# Patient Record
Sex: FEMALE | Race: ASIAN | NOT HISPANIC OR LATINO | Employment: FULL TIME | URBAN - METROPOLITAN AREA
[De-identification: names, ages, dates, MRNs, and addresses within clinical notes are randomized per-mention and may not be internally consistent; named-entity substitution may affect disease eponyms.]

---

## 2017-07-10 ENCOUNTER — OFFICE VISIT - HEALTHEAST (OUTPATIENT)
Dept: FAMILY MEDICINE | Facility: CLINIC | Age: 27
End: 2017-07-10

## 2017-07-10 DIAGNOSIS — Z30.9 CONTRACEPTION MANAGEMENT: ICD-10-CM

## 2017-07-10 ASSESSMENT — MIFFLIN-ST. JEOR: SCORE: 1148.14

## 2017-07-17 ENCOUNTER — OFFICE VISIT - HEALTHEAST (OUTPATIENT)
Dept: MIDWIFE SERVICES | Facility: CLINIC | Age: 27
End: 2017-07-17

## 2017-07-17 DIAGNOSIS — Z30.017 NEXPLANON INSERTION: ICD-10-CM

## 2017-07-17 ASSESSMENT — MIFFLIN-ST. JEOR: SCORE: 1145.87

## 2017-11-20 ENCOUNTER — AMBULATORY - HEALTHEAST (OUTPATIENT)
Dept: NURSING | Facility: CLINIC | Age: 27
End: 2017-11-20

## 2017-11-27 ENCOUNTER — OFFICE VISIT - HEALTHEAST (OUTPATIENT)
Dept: FAMILY MEDICINE | Facility: CLINIC | Age: 27
End: 2017-11-27

## 2017-11-27 DIAGNOSIS — Z23 IMMUNIZATION DUE: ICD-10-CM

## 2017-11-27 DIAGNOSIS — Z71.84 TRAVEL ADVICE ENCOUNTER: ICD-10-CM

## 2017-11-27 ASSESSMENT — MIFFLIN-ST. JEOR: SCORE: 1156.07

## 2018-03-28 ENCOUNTER — OFFICE VISIT - HEALTHEAST (OUTPATIENT)
Dept: FAMILY MEDICINE | Facility: CLINIC | Age: 28
End: 2018-03-28

## 2018-03-28 DIAGNOSIS — N64.4 BREAST PAIN, LEFT: ICD-10-CM

## 2018-03-28 ASSESSMENT — MIFFLIN-ST. JEOR: SCORE: 1190.09

## 2018-04-03 ENCOUNTER — HOSPITAL ENCOUNTER (OUTPATIENT)
Dept: MAMMOGRAPHY | Facility: CLINIC | Age: 28
Discharge: HOME | End: 2018-04-03
Attending: FAMILY MEDICINE

## 2018-04-03 DIAGNOSIS — N64.4 BREAST PAIN, LEFT: ICD-10-CM

## 2020-03-24 ENCOUNTER — OFFICE VISIT - HEALTHEAST (OUTPATIENT)
Dept: FAMILY MEDICINE | Facility: CLINIC | Age: 30
End: 2020-03-24

## 2020-03-24 DIAGNOSIS — B02.9 HERPES ZOSTER WITHOUT COMPLICATION: ICD-10-CM

## 2020-03-24 RX ORDER — IBUPROFEN 600 MG/1
600 TABLET, FILM COATED ORAL EVERY 6 HOURS PRN
Qty: 60 TABLET | Refills: 2 | Status: SHIPPED | OUTPATIENT
Start: 2020-03-24 | End: 2023-07-28

## 2020-07-15 ENCOUNTER — OFFICE VISIT - HEALTHEAST (OUTPATIENT)
Dept: FAMILY MEDICINE | Facility: CLINIC | Age: 30
End: 2020-07-15

## 2020-07-15 DIAGNOSIS — Z30.46 ENCOUNTER FOR REMOVAL AND REINSERTION OF NEXPLANON: ICD-10-CM

## 2020-07-15 ASSESSMENT — MIFFLIN-ST. JEOR: SCORE: 1198.03

## 2021-05-31 ENCOUNTER — RECORDS - HEALTHEAST (OUTPATIENT)
Dept: ADMINISTRATIVE | Facility: CLINIC | Age: 31
End: 2021-05-31

## 2021-05-31 VITALS — BODY MASS INDEX: 22.92 KG/M2 | WEIGHT: 116.75 LBS | HEIGHT: 60 IN

## 2021-05-31 VITALS — WEIGHT: 114.5 LBS | BODY MASS INDEX: 22.48 KG/M2 | HEIGHT: 60 IN

## 2021-05-31 VITALS — HEIGHT: 60 IN | WEIGHT: 115 LBS | BODY MASS INDEX: 22.58 KG/M2

## 2021-06-01 VITALS — HEIGHT: 60 IN | WEIGHT: 124.25 LBS | BODY MASS INDEX: 24.39 KG/M2

## 2021-06-04 VITALS
BODY MASS INDEX: 24.74 KG/M2 | TEMPERATURE: 97.1 F | OXYGEN SATURATION: 97 % | SYSTOLIC BLOOD PRESSURE: 100 MMHG | HEIGHT: 60 IN | DIASTOLIC BLOOD PRESSURE: 70 MMHG | RESPIRATION RATE: 20 BRPM | WEIGHT: 126 LBS | HEART RATE: 103 BPM

## 2021-06-07 NOTE — PROGRESS NOTES
Rohini Goodman is a 29 y.o. female who is being evaluated via a billable telephone visit.            Subjective:     Rohini Goodman is a 29 y.o. female who calls with complaint of    Chief Complaint   Patient presents with     Rash     on right groin X 4 days      Clear blisters.  Blisters getting brown.  Painful when walking/moving.  Had pain prior to rash appearling.  It's just on one leg.  Tried warm water    Still getting new spots.    No systemic sx (fever/malaise)    Review of Systems:  Remainder of complete review systems is negative.         Patient was evaluated by telephone rather than an in person visit due to currently outbreak of COVID-19.    The following portions of the patient's history were reviewed and updated as appropriate: allergies, current medications and problem list     Patient Active Problem List   Diagnosis     Beta-thalassemia Trait        No current outpatient medications on file.     Allergies  No known drug allergies      Objective:   Patient sent me a photo of rash.  The rash is on the right medial thigh and is vesicular with an erythematous base.  Sharply be demarcated region of rash.    Assessment/Plan:     1. Herpes zoster without complication  Since she is still developing new lesions, I am hopeful that valacyclovir will be helpful to her.  I have asked her to call again if worsening in the next few days.  Advised her it could take several weeks for symptoms to improve and that sometimes the discomfort lasts longer than the rash.  - valACYclovir (VALTREX) 1000 MG tablet; Take 1 tablet (1,000 mg total) by mouth 3 (three) times a day for 7 days. (For shingles/herpes zoster)  Dispense: 21 tablet; Refill: 0  - ibuprofen (ADVIL,MOTRIN) 600 MG tablet; Take 1 tablet (600 mg total) by mouth every 6 (six) hours as needed for pain. Take with food.  Dispense: 60 tablet; Refill: 2           Time visit started: 11:47 AM    Time visit ended: 11:57 AM     Phone call duration:  10 minutes  [id number]  (</= 10 min)  [id number] (11-20 min)  00947 (21-30 min)      Kasia Saul MD

## 2021-06-09 NOTE — PROGRESS NOTES
Procedure: Replacement of Nexplanon  Indication for procedure:  contraception, desires replacement of the same  Location: left  arm  Patient understands risks and benefits.  All of her questions were answered prior to starting the procedure.  She signed the consent form.  Anesthesia: 3 mL of 1% lidocaine without epinephrine was inserted through a 27-gauge needle at the incision site.  Procedure description: After anesthesia was achieved as above the area was prepped with iodine.  A small incision was made with 11 blade scalpel through the old scar from the previous implant insertion.  The implant was then grasped with a curved forceps and removed.  A new contraceptive implant was then inserted through this incision.  The incision was closed with a Steri-Strip and then the arm was wrapped with a pressure dressing.      Of note, pt is due for Pap Smear.  She declined to have this done today, but will make appointment to return to clinic for this.   Principal Discharge DX:	Facial contusion, initial encounter  Secondary Diagnosis:	Contusion of nose, initial encounter  Secondary Diagnosis:	Contusion of right shoulder, initial encounter

## 2021-06-11 NOTE — PROGRESS NOTES
SUBJECTIVE:    HPI:    27 y.o. female presenting today for Nexplanon insertion. She is a  with children ages 9 and 4. She is accompanied by her 3 yo son today. She had contraception counseling with Dr. Brown on 7/10/17. She has been on ortho Evra x 1 year and just ended that method prior to her LMP which started 7/10/17 and is ending today. She agreed to doing a UHCG today per our protocol.     She is Right hand dominant.  She has 2 child(pedrito) ages 9 and 4..  She is not breastfeeding.  Current contraception: Patch  Last sexual activity: She denies unprotected sexual activity in past 2 weeks.     PMH:  No known contraindications to Nexplanon      No current or past history of thrombosis or thromboembolic disorders       No hepatic tumors or active liver disease       No undiagnosed abnormal genital bleeding       No known or suspected carcinoma of the breast or personal history of breast cancer       No hypersensitivity to any of the components of NEXPLANON      No known history of keloids    ROS: 10 point review of symptoms otherwise negative except as detailed in HPI.     OBJECTIVE:  Physical Exam   Constitutional: She appears well-developed and well-nourished. No distress.   Cardiovascular: Normal rate and normal heart sounds.   No murmur heard.   Pulmonary/Chest: Effort normal and breath sounds normal.   Neurological: She is alert.   Skin: She is not diaphoretic.   Psychiatric: She has a normal mood and affect.     LABS: UPT today: Negative      ASSESSMENT:   We reviewed the Nexplanon literature and counseling re: full range of contraceptive options. She denies any contraindications to its use. We discussed that her bleeding profile will change. We discussed ACHES and encouraged her to call with any of those signs or symptoms. She is aware of 3 year effectiveness of this method.  She is aware of potential side effects including infection at site, intermenstrual bleeding, irregular bleeding, amenorrhea,  "need for minor surgical procedure to remove or more extensive if implant is deep    Appropriate candidate for Nexplanon    PLAN & PROCEDURE NOTE:  She elected to proceed with the placement after the risks and benefits were discussed. Denies allergy to local anesthesia, keloid formation.     Consent signed and will be scanned in EMR.     After cleansing left (non-dominant) arm above the elbow, 2 mL of 1% Lidocaine was administered along the planned injection site for Nexplanon. Insertion site cleansed with iodine. Nexplanon was then inserted 8cm above the medial epicondyle of the humerus, in the groove between the biceps and the triceps (sulcus bicipitalis medialis). Palpation revealed subdermal placement; the patient was able to feel implant as well.     Bleeding was minimal and a pressure dressing was applied with instructions to leave this in place for 24 hours. Pt was instructed to observe for signs/symptoms of any infection (localized tenderness, pain, inflammation) or excessive bruising.  No apparent distress at completion of procedure. No complications. Recommended condom back up x 7 - 10 days with method change.      Nexplanon LOT #: [id number]  Exp 08.2019  NDC# 1400-6170-42    Established patient  here for Nexplanon inertion.  25 \" spent >50% in counseling and care coordination.  JOSE ROBERTO Ann,CNM    "

## 2021-06-11 NOTE — PROGRESS NOTES
Chief Complaint   Patient presents with     Contraception     possible nexplanon         HPI:   Rohini Goodman is a 27 y.o. female with  and  in to discuss birth control options. They have two children 9 and 4.  They don't think they want more children, but are not ready to make final decision.  She has been on Depo shots in the past and c/o mood swings.  She is currently using the patch but is getting itchy from that.  She has used both paraguard and mirena IUD's and didn't like the strings.  She doesn't like having to take a pill every day      Medications:  Current Outpatient Prescriptions on File Prior to Visit   Medication Sig Dispense Refill     norelgestromin-ethinyl estradiol (ORTHO EVRA) 150-35 mcg/24 hr Place 1 patch on the skin every 7 days. 12 patch 11     No current facility-administered medications on file prior to visit.          Social History:  Social History   Substance Use Topics     Smoking status: Never Smoker     Smokeless tobacco: Never Used     Alcohol use Not on file         Physical Exam:   Vitals:    07/10/17 1439   BP: 116/78   Patient Site: Left Arm   Patient Position: Sitting   Cuff Size: Adult Regular   Pulse: 72   Resp: 16   Temp: 98.2  F (36.8  C)   TempSrc: Oral   Weight: 115 lb (52.2 kg)   Height: 5' (1.524 m)       GEN: NAD        Assessment/Plan:    1. Contraception management  Pregnancy (Beta-hCG, Qual), Urine      Full counseling on the many choices of family planning methods including tubal ligation, vasectomy, OCP (estrogen/progesterone), IUD, NuvaRing vaginal inserts, Depo-Provera injections and Ortho-Evra patches weekly is provided, and all questions answered. She would like to try the Nexplanon.  Discussed that she should expect irregular periods on this.  She will schedule to have this inserted.  Will continue with patch until switched to nexplanon.    15 minute visit, all spent in counseling.      The following portions of the patient's history were  reviewed and updated as appropriate: allergies, current medications, past family history, past medical history, past social history, past surgical history and problem list.    Tiffani Brown MD      7/10/2017

## 2021-06-14 NOTE — PROGRESS NOTES
Chief Complaint   Patient presents with     Travel Consult      traveing to [facility] for 1 month       HPI:  Rohini Goodman is a 27 y.o. female who presents  for travel consultation.    Planned departure date: 12/2/17            Planned return date: 1/2/18  Countries of travel: [facility]  Areas in country: rural and urban    Accommodations: private home  Purpose of travel: family visit  Prior travel out of [place]: born in [facility]  Currently ill / Fever: no  History of liver or kidney disease: no    Data Review:     The following portions of the patient's history were reviewed and updated as appropriate: allergies, current medications, past family history, past medical history, past social history, past surgical history and problem list.      Medications:  No current outpatient prescriptions on file prior to visit.     No current facility-administered medications on file prior to visit.         Review of Systems:  Pertinent items are noted in HPI.     EXAM:  Vitals:    11/27/17 1818   BP: 110/80   Patient Site: Right Arm   Patient Position: Sitting   Cuff Size: Adult Regular   Pulse: 80   Resp: 16   Temp: 98.4  F (36.9  C)   TempSrc: Oral   Weight: 116 lb 12 oz (53 kg)   Height: 5' (1.524 m)       GEN:  NAD  LUNGS:  Clear to auscultation without wheezing.  Normal effort.  HEART:  RRR without murmur, rub or gallop       ASSESSMENT:  1. Travel advice encounter  atovaquone-proguanil (MALARONE) 250-100 mg Tab    ciprofloxacin HCl (CIPRO) 500 MG tablet    TYPHOID, INACTIVE    Hepatitis A adult 2 dose IM (19 yr & older)   2. Immunization due  TYPHOID, INACTIVE    Hepatitis A adult 2 dose IM (19 yr & older)        No contraindications to travel.      PLAN:     Issues discussed: insect-borne illnesses, malaria, safe food/water, traveler's diarrhea, website/handouts for more information and what to do if ill upon return.  Immunizations recommended: Hepatitis A series, Influenza and Typhoid (parenteral).  Malaria  prophylaxis: malarone, daily dose starting 1-2 days before entering endemic area, ending 7 days after leaving area  Traveler's diarrhea prophylaxis: ciprofloxacin.    Patient declined influenza.  This will be her second Hep A.      The following portions of the patient's history were reviewed and updated as appropriate: allergies, current medications, past family history, past medical history, past social history, past surgical history and problem list.    Tiffani Brown MD    11/27/2017

## 2021-06-14 NOTE — PROGRESS NOTES
Patient came here today to try to get travel shots, but I told her that she needs to consult with her doctor before receiving them, so she is going to make an appt today at the .

## 2021-06-17 NOTE — PROGRESS NOTES
ASSESSMENT/PLAN:    Problem List Items Addressed This Visit     None      Visit Diagnoses     Breast pain, left    -  Primary    Most likely benign, but given the unexplained pain for prolonged period of time, will check diagnostic ultrasound to ensure no worrisome cause.    Relevant Orders    US Breast Limited (Focal) Left             SUBJECTIVE:  Rohini Goodman is a 27 y.o. female here for left breast pain off and on for 3 months.  Not sure what brought it on, no trauma.  Is not having regular periods due to Nexplanon, so not sure if it cycles with her periods.  Has not noticed any mass, just pain.  Is worried about breast cancer.    PHQ-2 Total Score: 0 (7/10/2017  2:00 PM)     The following portions of the patient's history were reviewed and updated as appropriate: allergies, current medications and problem list  No current outpatient prescriptions on file prior to visit.     No current facility-administered medications on file prior to visit.          History   Smoking Status     Never Smoker   Smokeless Tobacco     Never Used       OBJECTIVE:  :  /70  Pulse 86  Temp 98.3  F (36.8  C) (Oral)   Resp 16  Ht 5' (1.524 m)  Wt 124 lb 4 oz (56.4 kg)  SpO2 97%  BMI 24.27 kg/m2  Wt Readings from Last 3 Encounters:   03/28/18 124 lb 4 oz (56.4 kg)   11/27/17 116 lb 12 oz (53 kg)   07/17/17 114 lb 8 oz (51.9 kg)         Gen:  A&A, NAD  Left breast has some mild tenderness medially and just under the nipple.  There is a vague sense of fullness medially, but no discrete mass.  The right breast is normal without mass or tenderness.

## 2021-07-14 PROBLEM — Z30.017 NEXPLANON INSERTION: Status: RESOLVED | Noted: 2017-07-17 | Resolved: 2018-03-28

## 2021-10-15 ENCOUNTER — IMMUNIZATION (OUTPATIENT)
Dept: NURSING | Facility: CLINIC | Age: 31
End: 2021-10-15
Payer: COMMERCIAL

## 2021-10-15 PROCEDURE — 91301 PR COVID VAC MODERNA 100 MCG/0.5 ML IM: CPT

## 2021-10-15 PROCEDURE — 0011A PR COVID VAC MODERNA 100 MCG/0.5 ML IM: CPT

## 2021-11-12 ENCOUNTER — IMMUNIZATION (OUTPATIENT)
Dept: NURSING | Facility: CLINIC | Age: 31
End: 2021-11-12
Attending: FAMILY MEDICINE
Payer: COMMERCIAL

## 2021-11-12 PROCEDURE — 91301 PR COVID VAC MODERNA 100 MCG/0.5 ML IM: CPT

## 2021-11-12 PROCEDURE — 0012A PR COVID VAC MODERNA 100 MCG/0.5 ML IM: CPT

## 2023-04-03 ENCOUNTER — OFFICE VISIT (OUTPATIENT)
Dept: FAMILY MEDICINE | Facility: CLINIC | Age: 33
End: 2023-04-03
Payer: COMMERCIAL

## 2023-04-03 VITALS
DIASTOLIC BLOOD PRESSURE: 72 MMHG | RESPIRATION RATE: 18 BRPM | WEIGHT: 134 LBS | SYSTOLIC BLOOD PRESSURE: 111 MMHG | HEART RATE: 75 BPM | HEIGHT: 60 IN | OXYGEN SATURATION: 99 % | BODY MASS INDEX: 26.31 KG/M2

## 2023-04-03 DIAGNOSIS — B00.1 RECURRENT COLD SORES: Primary | ICD-10-CM

## 2023-04-03 PROCEDURE — 99213 OFFICE O/P EST LOW 20 MIN: CPT | Performed by: FAMILY MEDICINE

## 2023-04-03 RX ORDER — VALACYCLOVIR HYDROCHLORIDE 1 G/1
2000 TABLET, FILM COATED ORAL 2 TIMES DAILY
Qty: 4 TABLET | Refills: 6 | Status: SHIPPED | OUTPATIENT
Start: 2023-04-03 | End: 2023-04-04

## 2023-04-03 NOTE — PROGRESS NOTES
1. Recurrent cold sores  This is a 33 yo female with recurrent cold sores - discussed - would encourage her to try Valacyclovir oral at first sign of lesions; will try dose today for current lesions (even though she's already had this lesions for a few days) - will refill prescription as soon as she's finished this dose.    - valACYclovir (VALTREX) 1000 mg tablet; Take 2 tablets (2,000 mg) by mouth 2 times daily for 1 day (Start as soon as cold sore appears)  Dispense: 4 tablet; Refill: 6      Subjective   Rohini is a 32 year old, presenting for the following health issues:  Mouth/Lip Problem (Sore on the bottom lip x4-5 days)        4/3/2023     3:20 PM   Additional Questions   Roomed by Eleni     Has had lesion on left lower lip x couple days  Painful  Has had these before      Mouth/Lip Problem  Pertinent negatives include no abdominal pain, chest pain, chills, coughing or fever.   History of Present Illness       Reason for visit:  Sore on the lip    She eats 4 or more servings of fruits and vegetables daily.She consumes 1 sweetened beverage(s) daily.She exercises with enough effort to increase her heart rate 20 to 29 minutes per day.  She exercises with enough effort to increase her heart rate 5 days per week.   She is taking medications regularly.               Review of Systems   Constitutional: Negative for chills and fever.   Respiratory: Negative for cough and shortness of breath.    Cardiovascular: Negative for chest pain and peripheral edema.   Gastrointestinal: Negative for abdominal pain.   Skin: Positive for wound (left lower lip - blistery/draining).   Psychiatric/Behavioral: Negative.    All other systems reviewed and are negative.           Objective    /72 (BP Location: Left arm, Patient Position: Sitting, Cuff Size: Adult Regular)   Pulse 75   Resp 18   Ht 1.524 m (5')   Wt 60.8 kg (134 lb)   SpO2 99%   BMI 26.17 kg/m    Body mass index is 26.17 kg/m .  Physical Exam  Vitals  reviewed.   Constitutional:       General: She is not in acute distress.     Appearance: Normal appearance.   HENT:      Head: Normocephalic.      Right Ear: Tympanic membrane, ear canal and external ear normal.      Left Ear: Tympanic membrane, ear canal and external ear normal.      Nose: Nose normal.      Mouth/Throat:      Mouth: Mucous membranes are moist.      Pharynx: No posterior oropharyngeal erythema.   Eyes:      Extraocular Movements: Extraocular movements intact.      Conjunctiva/sclera: Conjunctivae normal.      Pupils: Pupils are equal, round, and reactive to light.   Cardiovascular:      Rate and Rhythm: Normal rate and regular rhythm.      Pulses: Normal pulses.      Heart sounds: Normal heart sounds. No murmur heard.  Pulmonary:      Effort: Pulmonary effort is normal.      Breath sounds: Normal breath sounds.   Abdominal:      Palpations: Abdomen is soft. There is no mass.      Tenderness: There is no abdominal tenderness. There is no guarding or rebound.   Musculoskeletal:         General: No deformity. Normal range of motion.      Cervical back: Normal range of motion and neck supple.   Lymphadenopathy:      Cervical: No cervical adenopathy.   Skin:     General: Skin is warm and dry.      Findings: Lesion (left lower lip:  vesicular ) present.   Neurological:      General: No focal deficit present.      Mental Status: She is alert.   Psychiatric:         Mood and Affect: Mood normal.         Behavior: Behavior normal.            No results found for any visits on 04/03/23.

## 2023-04-09 ASSESSMENT — ENCOUNTER SYMPTOMS
FEVER: 0
SHORTNESS OF BREATH: 0
ABDOMINAL PAIN: 0
CHILLS: 0
PSYCHIATRIC NEGATIVE: 1
COUGH: 0
WOUND: 1

## 2023-06-20 ENCOUNTER — OFFICE VISIT (OUTPATIENT)
Dept: FAMILY MEDICINE | Facility: CLINIC | Age: 33
End: 2023-06-20
Payer: COMMERCIAL

## 2023-06-20 VITALS
SYSTOLIC BLOOD PRESSURE: 108 MMHG | OXYGEN SATURATION: 100 % | RESPIRATION RATE: 16 BRPM | TEMPERATURE: 98 F | WEIGHT: 133.25 LBS | HEIGHT: 61 IN | DIASTOLIC BLOOD PRESSURE: 74 MMHG | BODY MASS INDEX: 25.16 KG/M2 | HEART RATE: 84 BPM

## 2023-06-20 DIAGNOSIS — Z13.1 SCREENING FOR DIABETES MELLITUS: ICD-10-CM

## 2023-06-20 DIAGNOSIS — Z13.220 SCREENING FOR HYPERLIPIDEMIA: ICD-10-CM

## 2023-06-20 DIAGNOSIS — Z11.59 NEED FOR HEPATITIS C SCREENING TEST: ICD-10-CM

## 2023-06-20 DIAGNOSIS — R53.83 OTHER FATIGUE: ICD-10-CM

## 2023-06-20 DIAGNOSIS — Z00.00 ROUTINE GENERAL MEDICAL EXAMINATION AT A HEALTH CARE FACILITY: Primary | ICD-10-CM

## 2023-06-20 DIAGNOSIS — Z13.0 SCREENING, ANEMIA, DEFICIENCY, IRON: ICD-10-CM

## 2023-06-20 DIAGNOSIS — Z13.29 SCREENING FOR THYROID DISORDER: ICD-10-CM

## 2023-06-20 DIAGNOSIS — Z12.4 SCREENING FOR CERVICAL CANCER: ICD-10-CM

## 2023-06-20 LAB
ANION GAP SERPL CALCULATED.3IONS-SCNC: 12 MMOL/L (ref 7–15)
BUN SERPL-MCNC: 16.1 MG/DL (ref 6–20)
CALCIUM SERPL-MCNC: 9.3 MG/DL (ref 8.6–10)
CHLORIDE SERPL-SCNC: 106 MMOL/L (ref 98–107)
CHOLEST SERPL-MCNC: 152 MG/DL
CREAT SERPL-MCNC: 0.51 MG/DL (ref 0.51–0.95)
DEPRECATED CALCIDIOL+CALCIFEROL SERPL-MC: 26 UG/L (ref 20–75)
DEPRECATED HCO3 PLAS-SCNC: 21 MMOL/L (ref 22–29)
ERYTHROCYTE [DISTWIDTH] IN BLOOD BY AUTOMATED COUNT: 17.8 % (ref 10–15)
GFR SERPL CREATININE-BSD FRML MDRD: >90 ML/MIN/1.73M2
GLUCOSE SERPL-MCNC: 113 MG/DL (ref 70–99)
HCT VFR BLD AUTO: 37.7 % (ref 35–47)
HDLC SERPL-MCNC: 33 MG/DL
HGB BLD-MCNC: 11.5 G/DL (ref 11.7–15.7)
LDLC SERPL CALC-MCNC: 78 MG/DL
MCH RBC QN AUTO: 18.6 PG (ref 26.5–33)
MCHC RBC AUTO-ENTMCNC: 30.5 G/DL (ref 31.5–36.5)
MCV RBC AUTO: 61 FL (ref 78–100)
NONHDLC SERPL-MCNC: 119 MG/DL
PLATELET # BLD AUTO: 303 10E3/UL (ref 150–450)
POTASSIUM SERPL-SCNC: 4.2 MMOL/L (ref 3.4–5.3)
RBC # BLD AUTO: 6.18 10E6/UL (ref 3.8–5.2)
SODIUM SERPL-SCNC: 139 MMOL/L (ref 136–145)
TRIGL SERPL-MCNC: 207 MG/DL
TSH SERPL DL<=0.005 MIU/L-ACNC: 2.81 UIU/ML (ref 0.3–4.2)
WBC # BLD AUTO: 8.1 10E3/UL (ref 4–11)

## 2023-06-20 PROCEDURE — 84443 ASSAY THYROID STIM HORMONE: CPT | Performed by: FAMILY MEDICINE

## 2023-06-20 PROCEDURE — 82306 VITAMIN D 25 HYDROXY: CPT | Performed by: FAMILY MEDICINE

## 2023-06-20 PROCEDURE — 85027 COMPLETE CBC AUTOMATED: CPT | Performed by: FAMILY MEDICINE

## 2023-06-20 PROCEDURE — G0145 SCR C/V CYTO,THINLAYER,RESCR: HCPCS | Performed by: FAMILY MEDICINE

## 2023-06-20 PROCEDURE — 87624 HPV HI-RISK TYP POOLED RSLT: CPT | Performed by: FAMILY MEDICINE

## 2023-06-20 PROCEDURE — 99395 PREV VISIT EST AGE 18-39: CPT | Performed by: FAMILY MEDICINE

## 2023-06-20 PROCEDURE — 80048 BASIC METABOLIC PNL TOTAL CA: CPT | Performed by: FAMILY MEDICINE

## 2023-06-20 PROCEDURE — 36415 COLL VENOUS BLD VENIPUNCTURE: CPT | Performed by: FAMILY MEDICINE

## 2023-06-20 PROCEDURE — 80061 LIPID PANEL: CPT | Performed by: FAMILY MEDICINE

## 2023-06-20 ASSESSMENT — ENCOUNTER SYMPTOMS
DIARRHEA: 0
DIZZINESS: 0
EYE PAIN: 0
WEAKNESS: 0
MYALGIAS: 0
SORE THROAT: 0
FREQUENCY: 0
NAUSEA: 0
NERVOUS/ANXIOUS: 0
CHILLS: 0
HEADACHES: 0
BREAST MASS: 0
PALPITATIONS: 0
COUGH: 0
JOINT SWELLING: 0
FEVER: 0
ABDOMINAL PAIN: 0
HEMATOCHEZIA: 0
DYSURIA: 0
SHORTNESS OF BREATH: 0
HEMATURIA: 0
ARTHRALGIAS: 0
HEARTBURN: 0
CONSTIPATION: 0
PARESTHESIAS: 0

## 2023-06-20 NOTE — PROGRESS NOTES
SUBJECTIVE:   CC: Rohini is an 33 year old who presents for preventive health visit.     Healthy Habits:    Getting at least 3 servings of Calcium per day:  Yes    Bi-annual eye exam:  Yes    Dental care twice a year:  Yes    Sleep apnea or symptoms of sleep apnea:  None    Diet:  Regular (no restrictions)    Frequency of exercise:  None    Taking medications regularly:  Yes    Medication side effects:  None    PHQ-2 Total Score:    Additional concerns today:  No      Have you ever done Advance Care Planning? (For example, a Health Directive, POLST, or a discussion with a medical provider or your loved ones about your wishes): No, advance care planning information given to patient to review.  Patient plans to discuss their wishes with loved ones or provider.      Social History     Tobacco Use     Smoking status: Never     Passive exposure: Never     Smokeless tobacco: Never   Vaping Use     Vaping status: Never Used   Substance Use Topics     Alcohol use: Not on file             6/20/2023     6:56 AM   Alcohol Use   Prescreen: >3 drinks/day or >7 drinks/week? No     Reviewed orders with patient.  Reviewed health maintenance and updated orders accordingly - Yes  Lab work is in process  Labs reviewed in EPIC    Breast Cancer Screening:    FHS-7:        View : No data to display.                Patient under 40 years of age: Routine Mammogram Screening not recommended.   Pertinent mammograms are reviewed under the imaging tab.    History of abnormal Pap smear: NO - age 30-65 PAP every 5 years with negative HPV co-testing recommended      9/29/2015    11:04 AM   PAP / HPV   PAP Negative for squamous intraepithelial lesion or malignancy  Electronically signed by Lauren Roche CT (ASCP) on 10/9/2015 at  1:17 PM         Reviewed and updated as needed this visit by clinical staff   Tobacco  Allergies  Meds  Problems  Med Hx  Surg Hx  Fam Hx          Reviewed and updated as needed this visit by Provider    "Tobacco  Allergies  Meds  Problems  Med Hx  Surg Hx  Fam Hx             Review of Systems   Constitutional: Negative for chills and fever.   HENT: Negative for congestion, ear pain, hearing loss and sore throat.    Eyes: Negative for pain and visual disturbance.   Respiratory: Negative for cough and shortness of breath.    Cardiovascular: Negative for chest pain, palpitations and peripheral edema.   Gastrointestinal: Negative for abdominal pain, constipation, diarrhea, heartburn, hematochezia and nausea.   Breasts:  Negative for tenderness, breast mass and discharge.   Genitourinary: Negative for dysuria, frequency, genital sores, hematuria, pelvic pain, urgency, vaginal bleeding and vaginal discharge.   Musculoskeletal: Negative for arthralgias, joint swelling and myalgias.   Skin: Negative for rash.   Neurological: Negative for dizziness, weakness, headaches and paresthesias.   Psychiatric/Behavioral: Negative for mood changes. The patient is not nervous/anxious.           OBJECTIVE:   /74   Pulse 84   Temp 98  F (36.7  C) (Oral)   Resp 16   Ht 1.537 m (5' 0.5\")   Wt 60.4 kg (133 lb 4 oz)   LMP  (LMP Unknown)   SpO2 100%   BMI 25.60 kg/m    Physical Exam  GENERAL: healthy, alert and no distress  EYES: Eyes grossly normal to inspection, PERRL and conjunctivae and sclerae normal  HENT: ear canals and TM's normal, nose and mouth without ulcers or lesions  NECK: no adenopathy, no asymmetry, masses, or scars and thyroid normal to palpation  RESP: lungs clear to auscultation - no rales, rhonchi or wheezes  CV: regular rate and rhythm, normal S1 S2, no S3 or S4, no murmur, click or rub, no peripheral edema and peripheral pulses strong  ABDOMEN: soft, nontender, no hepatosplenomegaly, no masses and bowel sounds normal  MS: no gross musculoskeletal defects noted, no edema  SKIN: no suspicious lesions or rashes  NEURO: Normal strength and tone, mentation intact and speech normal  PSYCH: mentation " appears normal, affect normal/bright    Diagnostic Test Results:  Labs reviewed in Epic  Results for orders placed or performed in visit on 06/20/23 (from the past 24 hour(s))   CBC with platelets   Result Value Ref Range    WBC Count 8.1 4.0 - 11.0 10e3/uL    RBC Count 6.18 (H) 3.80 - 5.20 10e6/uL    Hemoglobin 11.5 (L) 11.7 - 15.7 g/dL    Hematocrit 37.7 35.0 - 47.0 %    MCV 61 (L) 78 - 100 fL    MCH 18.6 (L) 26.5 - 33.0 pg    MCHC 30.5 (L) 31.5 - 36.5 g/dL    RDW 17.8 (H) 10.0 - 15.0 %    Platelet Count 303 150 - 450 10e3/uL     Results for orders placed or performed in visit on 06/20/23   CBC with platelets     Status: Abnormal   Result Value Ref Range    WBC Count 8.1 4.0 - 11.0 10e3/uL    RBC Count 6.18 (H) 3.80 - 5.20 10e6/uL    Hemoglobin 11.5 (L) 11.7 - 15.7 g/dL    Hematocrit 37.7 35.0 - 47.0 %    MCV 61 (L) 78 - 100 fL    MCH 18.6 (L) 26.5 - 33.0 pg    MCHC 30.5 (L) 31.5 - 36.5 g/dL    RDW 17.8 (H) 10.0 - 15.0 %    Platelet Count 303 150 - 450 10e3/uL       Wt Readings from Last 4 Encounters:   06/20/23 60.4 kg (133 lb 4 oz)   04/03/23 60.8 kg (134 lb)   07/15/20 57.2 kg (126 lb)   03/28/18 56.4 kg (124 lb 4 oz)         ASSESSMENT/PLAN:   Rohini was seen today for physical.    Diagnoses and all orders for this visit:    Routine general medical examination at a health care facility  No concerns.   Nexplanon scheduled to be replaced next month.  Declined Tdap  Declined covid booster    SCREENING TEST  Screening for cervical cancer-     PAP screen with HPV - recommended age 30 - 65 years  Screening for thyroid disorder -     TSH with free T4 reflex  Screening, anemia, deficiency, iron -     CBC with platelets  Screening for diabetes mellitus -     Basic metabolic panel  (Ca, Cl, CO2, Creat, Gluc, K, Na, BUN)  Screening for hyperlipidemia -     Lipid panel reflex to direct LDL Non-fasting  Need for hepatitis C screening test - low risk  Other fatigue -     Vitamin D Deficiency        Patient has been advised  "of split billing requirements and indicates understanding: Yes      COUNSELING:  Reviewed preventive health counseling, as reflected in patient instructions      BMI:   Estimated body mass index is 25.6 kg/m  as calculated from the following:    Height as of this encounter: 1.537 m (5' 0.5\").    Weight as of this encounter: 60.4 kg (133 lb 4 oz).   Weight management plan: Patient was referred to their PCP to discuss a diet and exercise plan.      She reports that she has never smoked. She has never been exposed to tobacco smoke. She has never used smokeless tobacco.      Ela Greene MD  [facility]"

## 2023-06-22 LAB
BKR LAB AP GYN ADEQUACY: NORMAL
BKR LAB AP GYN INTERPRETATION: NORMAL
BKR LAB AP HPV REFLEX: NORMAL
BKR LAB AP PREVIOUS ABNORMAL: NORMAL
PATH REPORT.COMMENTS IMP SPEC: NORMAL
PATH REPORT.COMMENTS IMP SPEC: NORMAL
PATH REPORT.RELEVANT HX SPEC: NORMAL

## 2023-06-23 LAB
HUMAN PAPILLOMA VIRUS 16 DNA: NEGATIVE
HUMAN PAPILLOMA VIRUS 18 DNA: NEGATIVE
HUMAN PAPILLOMA VIRUS FINAL DIAGNOSIS: NORMAL
HUMAN PAPILLOMA VIRUS OTHER HR: NEGATIVE

## 2023-07-28 ENCOUNTER — OFFICE VISIT (OUTPATIENT)
Dept: FAMILY MEDICINE | Facility: CLINIC | Age: 33
End: 2023-07-28
Payer: COMMERCIAL

## 2023-07-28 VITALS
SYSTOLIC BLOOD PRESSURE: 103 MMHG | HEIGHT: 60 IN | RESPIRATION RATE: 16 BRPM | OXYGEN SATURATION: 100 % | HEART RATE: 74 BPM | BODY MASS INDEX: 26.35 KG/M2 | WEIGHT: 134.25 LBS | DIASTOLIC BLOOD PRESSURE: 69 MMHG | TEMPERATURE: 98 F

## 2023-07-28 DIAGNOSIS — Z30.017 INSERTION OF IMPLANTABLE SUBDERMAL CONTRACEPTIVE: ICD-10-CM

## 2023-07-28 DIAGNOSIS — Z30.46 ENCOUNTER FOR REMOVAL AND REINSERTION OF NEXPLANON: Primary | ICD-10-CM

## 2023-07-28 PROCEDURE — 11983 REMOVE/INSERT DRUG IMPLANT: CPT | Performed by: FAMILY MEDICINE

## 2023-07-28 NOTE — PROGRESS NOTES
[facility]  DOS: 2023   used: none - English fluent       SUBJECTIVE:    HPI:    33 year old female presenting today for Nexplanon insertion.     She is right hand dominant.  She is not breastfeeding.  Current contraception:  nexplanon , placed 2020       PMH:  No known contraindications to Nexplanon      No current or past history of thrombosis or thromboembolic disorders       No hepatic tumors or active liver disease       No undiagnosed abnormal genital bleeding       No known or suspected carcinoma of the breast or personal history of breast cancer       No hypersensitivity to any of the components of NEXPLANON      No known history of keloids    ROS: 10 point review of symptoms otherwise negative except as detailed in HPI.     OBJECTIVE:    LABS: UPT not checked because current nexplanon is not     No visits with results within 1 Day(s) from this visit.   Latest known visit with results is:   Office Visit on 2023   Component Date Value Ref Range Status    Interpretation 2023 Negative for Intraepithelial Lesion or Malignancy (NILM)    Final    Comment 2023    Final                    Value:This result contains rich text formatting which cannot be displayed here.    Specimen Adequacy 2023 Satisfactory for evaluation, endocervical/transformation zone component present   Final    Clinical Information 2023    Final                    Value:This result contains rich text formatting which cannot be displayed here.    Reflex Testing 2023 Yes regardless of result   Final    Previous Abnormal? 2023    Final                    Value:This result contains rich text formatting which cannot be displayed here.    Performing Labs 2023    Final                    Value:This result contains rich text formatting which cannot be displayed here.    WBC Count 2023 8.1  4.0 - 11.0 10e3/uL Final    RBC Count 2023 6.18 (H)   3.80 - 5.20 10e6/uL Final    Hemoglobin 06/20/2023 11.5 (L)  11.7 - 15.7 g/dL Final    Hematocrit 06/20/2023 37.7  35.0 - 47.0 % Final    MCV 06/20/2023 61 (L)  78 - 100 fL Final    MCH 06/20/2023 18.6 (L)  26.5 - 33.0 pg Final    MCHC 06/20/2023 30.5 (L)  31.5 - 36.5 g/dL Final    RDW 06/20/2023 17.8 (H)  10.0 - 15.0 % Final    Platelet Count 06/20/2023 303  150 - 450 10e3/uL Final    Sodium 06/20/2023 139  136 - 145 mmol/L Final    Potassium 06/20/2023 4.2  3.4 - 5.3 mmol/L Final    Chloride 06/20/2023 106  98 - 107 mmol/L Final    Carbon Dioxide (CO2) 06/20/2023 21 (L)  22 - 29 mmol/L Final    Anion Gap 06/20/2023 12  7 - 15 mmol/L Final    Urea Nitrogen 06/20/2023 16.1  6.0 - 20.0 mg/dL Final    Creatinine 06/20/2023 0.51  0.51 - 0.95 mg/dL Final    Calcium 06/20/2023 9.3  8.6 - 10.0 mg/dL Final    Glucose 06/20/2023 113 (H)  70 - 99 mg/dL Final    GFR Estimate 06/20/2023 >90  >60 mL/min/1.73m2 Final    Cholesterol 06/20/2023 152  <200 mg/dL Final    Triglycerides 06/20/2023 207 (H)  <150 mg/dL Final    Direct Measure HDL 06/20/2023 33 (L)  >=50 mg/dL Final    LDL Cholesterol Calculated 06/20/2023 78  <=100 mg/dL Final    Non HDL Cholesterol 06/20/2023 119  <130 mg/dL Final    TSH 06/20/2023 2.81  0.30 - 4.20 uIU/mL Final    Vitamin D, Total (25-Hydroxy) 06/20/2023 26  20 - 75 ug/L Final    Other HR HPV 06/20/2023 Negative  Negative Final    HPV16 DNA 06/20/2023 Negative  Negative Final    HPV18 DNA 06/20/2023 Negative  Negative Final    FINAL DIAGNOSIS 06/20/2023    Final                    Value:This result contains rich text formatting which cannot be displayed here.       ASSESSMENT:   We reviewed the Nexplanon literature and counseling re: full range of contraceptive options. She denies any contraindications to its use. We discussed that her bleeding profile will change. She is aware of 3 year effectiveness of this method.  She is aware of potential side effects including infection at site, intermenstrual  bleeding, irregular bleeding, amenorrhea, need for minor surgical procedure to remove or more extensive if implant is deep    Appropriate candidate for Nexplanon    PLAN & PROCEDURE NOTE:  She elected to proceed with the placement after the risks and benefits were discussed. Denies allergy to local anesthesia, keloid formation.     Consent signed and will be scanned in EMR.     PROCEDURE NOTE: Nexplanon Removal and Reinsertion    After cleansing left arm above the elbow, 2 mL of 1% Lidocaine was administered along the distal edge of palpated Nexplanon implant. Insertion site cleansed with iodine. 0.25 cm incision was made at tip of Nexplanon with 11 blade scalpel. The tip was visualized and was grasped with pickups. Nexplanon was easily removed. Nexplanon was then inserted in the same location per patient prference. Palpation revealed subdermal placement; the patient was able to feel implant as well.     Bleeding was minimal and a pressure dressing was applied with instructions to leave this in place for 24 hours. Pt was instructed to observe for signs/symptoms of any infection (localized tenderness, pain, inflammation) or excessive bruising.  No apparent distress at completion of procedure. No complications.        Lauren Shoemaker MD

## 2024-05-23 ENCOUNTER — PATIENT OUTREACH (OUTPATIENT)
Dept: CARE COORDINATION | Facility: CLINIC | Age: 34
End: 2024-05-23
Payer: COMMERCIAL

## 2024-06-06 ENCOUNTER — PATIENT OUTREACH (OUTPATIENT)
Dept: CARE COORDINATION | Facility: CLINIC | Age: 34
End: 2024-06-06
Payer: COMMERCIAL